# Patient Record
Sex: MALE | Race: WHITE | Employment: PART TIME | URBAN - METROPOLITAN AREA
[De-identification: names, ages, dates, MRNs, and addresses within clinical notes are randomized per-mention and may not be internally consistent; named-entity substitution may affect disease eponyms.]

---

## 2017-04-13 ENCOUNTER — OFFICE VISIT (OUTPATIENT)
Dept: FAMILY MEDICINE CLINIC | Age: 52
End: 2017-04-13

## 2017-04-13 VITALS
WEIGHT: 238.4 LBS | DIASTOLIC BLOOD PRESSURE: 102 MMHG | TEMPERATURE: 96.4 F | OXYGEN SATURATION: 94 % | HEART RATE: 101 BPM | RESPIRATION RATE: 16 BRPM | SYSTOLIC BLOOD PRESSURE: 161 MMHG | HEIGHT: 70 IN | BODY MASS INDEX: 34.13 KG/M2

## 2017-04-13 DIAGNOSIS — F10.20 ALCOHOLISM (HCC): Primary | ICD-10-CM

## 2017-04-13 RX ORDER — DISULFIRAM 250 MG/1
250 TABLET ORAL DAILY
Qty: 15 TAB | Refills: 0 | Status: SHIPPED | OUTPATIENT
Start: 2017-04-13 | End: 2017-04-17 | Stop reason: SDUPTHER

## 2017-04-13 RX ORDER — CHLORDIAZEPOXIDE HYDROCHLORIDE 10 MG/1
CAPSULE, GELATIN COATED ORAL
Qty: 30 CAP | Refills: 0 | Status: SHIPPED | OUTPATIENT
Start: 2017-04-13 | End: 2017-05-01

## 2017-04-13 NOTE — PROGRESS NOTES
Brad Quiroz Sr. is a 46 y.o.  male and presents with    Chief Complaint   Patient presents with    Alcohol intoxication           Subjective:  Pt is an out of control alcoholic that wants to stop. But when he stos gets very shaky. No hx of seizures from stopping. Additional Concerns:          Patient Active Problem List    Diagnosis Date Noted    Erectile dysfunction 05/27/2014    Back pain 05/27/2014    Depression 05/27/2014     Current Outpatient Prescriptions   Medication Sig Dispense Refill    chlordiazePOXIDE (LIBRIUM) 10 mg capsule 1 po tid x 3 days, then 1 po bid x 3 days, then 1 po daily. 30 Cap 0    disulfiram (ANTABUSE) 250 mg tablet Take 1 Tab by mouth daily. 15 Tab 0     No Known Allergies  Past Medical History:   Diagnosis Date    Depression      No past surgical history on file. No family history on file. Social History   Substance Use Topics    Smoking status: Former Smoker     Years: 23.00     Types: Cigarettes     Quit date: 2/20/2008    Smokeless tobacco: Never Used    Alcohol use Yes      Comment: 30 drinks a week that contain alcohol       ROS       All other systems reviewed and are negative.       Objective:  Vitals:    04/13/17 1335   BP: (!) 161/102   Pulse: (!) 101   Resp: 16   Temp: 96.4 °F (35.8 °C)   TempSrc: Oral   SpO2: 94%   Weight: 238 lb 6.4 oz (108.1 kg)   Height: 5' 10\" (1.778 m)   PainSc:   0 - No pain                 alert, well appearing, and in no distress, oriented to person, place, and time and normal appearing weight  Chest - clear to auscultation, no wheezes, rales or rhonchi, symmetric air entry  Heart - normal rate, regular rhythm, normal S1, S2, no murmurs, rubs, clicks or gallops  Abdomen - soft, nontender, nondistended, no masses or organomegaly        LABS     TESTS      Assessment/Plan:    Alcoholism  Will start anabuse and librium to help withdrawl  Check labs f/u  4 days      Lab review: labs are reviewed, up to date and normal      I have discussed the diagnosis with the patient and the intended plan as seen in the above orders. The patient has received an after-visit summary and questions were answered concerning future plans. I have discussed medication side effects and warnings with the patient as well. I have reviewed the plan of care with the patient, accepted their input and they are in agreement with the treatment goals.      Follow-up Disposition: Not on File

## 2017-04-13 NOTE — PROGRESS NOTES
1. Have you been to the ER, urgent care clinic since your last visit? Hospitalized since your last visit? No    2. Have you seen or consulted any other health care providers outside of the [address] [place] [address] since your last visit? Include any pap smears or colon screening.  No

## 2017-04-13 NOTE — MR AVS SNAPSHOT
Visit Information Date & Time Provider Department Dept. Phone Encounter #  
 4/13/2017  1:30 PM Emely Richardson [phone] [id number] Follow-up Instructions Return in about 4 days (around 4/17/2017) for EOV, labs today. Your Appointments 5/1/2017 12:30 PM  
COMPLETE PHYSICAL with [place]acie Cifuentes [place] [address] [facility]) Appt Note: cpe  
 [address] [place] [address]  
  
   
 [address] [facility] [address] Upcoming Health Maintenance Date Due Hepatitis C Screening 1965 DTaP/Tdap/Td series (2 - Td) 5/30/2011 FOBT Q 1 YEAR AGE 50-75 6/2/2015 INFLUENZA AGE 9 TO ADULT 8/1/2016 Allergies as of 4/13/2017  Review Complete On: 7/21/2014 By: Gracie Cifuentes DO No Known Allergies Current Immunizations  Reviewed on 5/27/2014 Name Date DTaP 5/30/2001 Hep A Vaccine 2/22/1999, 2/20/1993 MMR 5/15/1996 Meningococcal Vaccine 2/26/1985 Poliovirus vaccine 2/26/1985 Rubella Virus Vaccine 8/7/1986 Yellow Fever Vaccine 8/22/2006, 5/15/1996, 4/16/1985 Not reviewed this visit You Were Diagnosed With   
  
 Codes Comments Alcoholism ([facility].)    -  Primary ICD-10-CM: F10.20 ICD-9-CM: 303.90 Vitals BP Pulse Temp Resp Height(growth percentile) Weight(growth percentile) (!) 161/102 (BP 1 Location: Right arm, BP Patient Position: Sitting) (!) 101 96.4 °F (35.8 °C) (Oral) 16 5' 10\" (1.778 m) 238 lb 6.4 oz (108.1 kg) SpO2 BMI Smoking Status 94% 34.21 kg/m2 Former Smoker BMI and BSA Data Body Mass Index Body Surface Area  
 34.21 kg/m 2 2.31 m 2 Preferred Pharmacy Pharmacy Name Phone [id number] [place], [address] [place] [address] Ave [phone] Your Updated Medication List  
  
   
This list is accurate as of: 4/13/17  1:47 PM.  Always use your most recent med list.  
  
  
  
  
 chlordiazePOXIDE 10 mg capsule Commonly known as:  LIBRIUM  
1 po tid x 3 days, then 1 po bid x 3 days, then 1 po daily. disulfiram 250 mg tablet Commonly known as:  ANTABUSE Take 1 Tab by mouth daily. Prescriptions Printed Refills  
 chlordiazePOXIDE (LIBRIUM) 10 mg capsule 0 Si po tid x 3 days, then 1 po bid x 3 days, then 1 po daily. Class: Print  
 disulfiram (ANTABUSE) 250 mg tablet 0 Sig: Take 1 Tab by mouth daily. Class: Print Route: Oral  
  
Follow-up Instructions Return in about 4 days (around 2017) for EOV, labs today. To-Do List   
 2017 Lab:  CBC WITH AUTOMATED DIFF   
  
 2017 Lab:  GGT   
  
 2017 Lab:  HEMOGLOBIN A1C WITH EAG   
  
 2017 Lab:  LIPID PANEL   
  
 2017 Lab:  MAGNESIUM   
  
 2017 Lab:  METABOLIC PANEL, COMPREHENSIVE   
  
 2017 Lab:  PROSTATE SPECIFIC AG (PSA)   
  
 2017 Lab:  TSH 3RD GENERATION   
  
 2017 Lab:  URIC ACID   
  
 2017 Lab:  URINALYSIS W/ RFLX MICROSCOPIC   
  
 2017 Lab:  VITAMIN B12 & FOLATE   
  
 2017 Lab:  VITAMIN D, 1, 25 DIHYDROXY Introducing [facility] & HEALTH SERVICES! [employer] introduces [employer] patient portal. Now you can access parts of your medical record, email your doctor's office, and request medication refills online. 1. In your internet browser, go to [email or web address] 2. Click on the First Time User? Click Here link in the Sign In box. You will see the New Member Sign Up page. 3. Enter your [employer] Access Code exactly as it appears below. You will not need to use this code after youve completed the sign-up process. If you do not sign up before the expiration date, you must request a new code. · [employer] Access Code: [email or web address] Expires: 2017  1:47 PM 
 
 4. Enter the last four digits of your Social Security Number (xxxx) and Date of Birth (mm/dd/yyyy) as indicated and click Submit. You will be taken to the next sign-up page. 5. Create a [employer] ID. This will be your [employer] login ID and cannot be changed, so think of one that is secure and easy to remember. 6. Create a [employer] password. You can change your password at any time. 7. Enter your Password Reset Question and Answer. This can be used at a later time if you forget your password. 8. Enter your e-mail address. You will receive e-mail notification when new information is available in [address] 9. Click Sign Up. You can now view and download portions of your medical record. 10. Click the Download Summary menu link to download a portable copy of your medical information. If you have questions, please visit the Frequently Asked Questions section of the [employer] website. Remember, [employer] is NOT to be used for urgent needs. For medical emergencies, dial 911. Now available from your iPhone and Android! Please provide this summary of care documentation to your next provider. Your primary care clinician is listed as [address] [facility] [address]. If you have any questions after today's visit, please call [phone].

## 2017-04-14 ENCOUNTER — HOSPITAL ENCOUNTER (OUTPATIENT)
Dept: LAB | Age: 52
Discharge: HOME | End: 2017-04-14
Payer: OTHER GOVERNMENT

## 2017-04-14 DIAGNOSIS — F10.20 ALCOHOLISM (HCC): ICD-10-CM

## 2017-04-14 LAB
ALBUMIN SERPL BCP-MCNC: 4.3 G/DL (ref 3.4–5)
ALBUMIN/GLOB SERPL: 1.2 {RATIO} (ref 0.8–1.7)
ALP SERPL-CCNC: 103 U/L (ref 45–117)
ALT SERPL-CCNC: 190 U/L (ref 16–61)
ANION GAP BLD CALC-SCNC: 15 MMOL/L (ref 3–18)
APPEARANCE UR: ABNORMAL
AST SERPL W P-5'-P-CCNC: 180 U/L (ref 15–37)
BACTERIA URNS QL MICRO: ABNORMAL /HPF
BASOPHILS # BLD AUTO: 0 K/UL (ref 0–0.06)
BASOPHILS # BLD: 0 % (ref 0–2)
BILIRUB SERPL-MCNC: 1.9 MG/DL (ref 0.2–1)
BILIRUB UR QL: ABNORMAL
BUN SERPL-MCNC: 15 MG/DL (ref 7–18)
BUN/CREAT SERPL: 23 (ref 12–20)
CALCIUM SERPL-MCNC: 8.8 MG/DL (ref 8.5–10.1)
CHLORIDE SERPL-SCNC: 102 MMOL/L (ref 100–108)
CHOLEST SERPL-MCNC: 232 MG/DL
CO2 SERPL-SCNC: 24 MMOL/L (ref 21–32)
COLOR UR: ABNORMAL
CREAT SERPL-MCNC: 0.66 MG/DL (ref 0.6–1.3)
DIFFERENTIAL METHOD BLD: ABNORMAL
EOSINOPHIL # BLD: 0 K/UL (ref 0–0.4)
EOSINOPHIL NFR BLD: 0 % (ref 0–5)
EPITH CASTS URNS QL MICRO: ABNORMAL /LPF (ref 0–5)
ERYTHROCYTE [DISTWIDTH] IN BLOOD BY AUTOMATED COUNT: 14.5 % (ref 11.6–14.5)
EST. AVERAGE GLUCOSE BLD GHB EST-MCNC: NORMAL MG/DL
FOLATE SERPL-MCNC: 11.4 NG/ML (ref 3.1–17.5)
GLOBULIN SER CALC-MCNC: 3.5 G/DL (ref 2–4)
GLUCOSE SERPL-MCNC: 91 MG/DL (ref 74–99)
GLUCOSE UR STRIP.AUTO-MCNC: NEGATIVE MG/DL
HBA1C MFR BLD: 4.8 % (ref 4.2–5.6)
HCT VFR BLD AUTO: 44.5 % (ref 36–48)
HDLC SERPL-MCNC: 56 MG/DL (ref 40–60)
HDLC SERPL: 4.1 {RATIO} (ref 0–5)
HGB BLD-MCNC: 15.9 G/DL (ref 13–16)
HGB UR QL STRIP: ABNORMAL
KETONES UR QL STRIP.AUTO: 80 MG/DL
LDLC SERPL CALC-MCNC: 116 MG/DL (ref 0–100)
LEUKOCYTE ESTERASE UR QL STRIP.AUTO: ABNORMAL
LIPID PROFILE,FLP: ABNORMAL
LYMPHOCYTES # BLD AUTO: 51 % (ref 21–52)
LYMPHOCYTES # BLD: 2.4 K/UL (ref 0.9–3.6)
MAGNESIUM SERPL-MCNC: 1.7 MG/DL (ref 1.6–2.6)
MCH RBC QN AUTO: 35.1 PG (ref 24–34)
MCHC RBC AUTO-ENTMCNC: 35.7 G/DL (ref 31–37)
MCV RBC AUTO: 98.2 FL (ref 74–97)
MONOCYTES # BLD: 0.2 K/UL (ref 0.05–1.2)
MONOCYTES NFR BLD AUTO: 5 % (ref 3–10)
NEUTS SEG # BLD: 2 K/UL (ref 1.8–8)
NEUTS SEG NFR BLD AUTO: 44 % (ref 40–73)
NITRITE UR QL STRIP.AUTO: POSITIVE
PH UR STRIP: 6 [PH] (ref 5–8)
PLATELET # BLD AUTO: 101 K/UL (ref 135–420)
PMV BLD AUTO: 13 FL (ref 9.2–11.8)
POTASSIUM SERPL-SCNC: 3.7 MMOL/L (ref 3.5–5.5)
PROT SERPL-MCNC: 7.8 G/DL (ref 6.4–8.2)
PROT UR STRIP-MCNC: 300 MG/DL
PSA SERPL-MCNC: 1.4 NG/ML (ref 0–4)
RBC # BLD AUTO: 4.53 M/UL (ref 4.7–5.5)
RBC #/AREA URNS HPF: 0 /HPF (ref 0–5)
SODIUM SERPL-SCNC: 141 MMOL/L (ref 136–145)
SP GR UR REFRACTOMETRY: 1.02 (ref 1–1.03)
TRIGL SERPL-MCNC: 300 MG/DL (ref ?–150)
TSH SERPL DL<=0.05 MIU/L-ACNC: 0.46 UIU/ML (ref 0.36–3.74)
URATE SERPL-MCNC: 8.2 MG/DL (ref 2.6–7.2)
UROBILINOGEN UR QL STRIP.AUTO: 1 EU/DL (ref 0.2–1)
VIT B12 SERPL-MCNC: 910 PG/ML (ref 211–911)
VLDLC SERPL CALC-MCNC: 60 MG/DL
WBC # BLD AUTO: 4.6 K/UL (ref 4.6–13.2)
WBC URNS QL MICRO: ABNORMAL /HPF (ref 0–4)

## 2017-04-14 PROCEDURE — 82607 VITAMIN B-12: CPT | Performed by: FAMILY MEDICINE

## 2017-04-14 PROCEDURE — 84443 ASSAY THYROID STIM HORMONE: CPT | Performed by: FAMILY MEDICINE

## 2017-04-14 PROCEDURE — 84550 ASSAY OF BLOOD/URIC ACID: CPT | Performed by: FAMILY MEDICINE

## 2017-04-14 PROCEDURE — 80053 COMPREHEN METABOLIC PANEL: CPT | Performed by: FAMILY MEDICINE

## 2017-04-14 PROCEDURE — 82977 ASSAY OF GGT: CPT | Performed by: FAMILY MEDICINE

## 2017-04-14 PROCEDURE — 82652 VIT D 1 25-DIHYDROXY: CPT | Performed by: FAMILY MEDICINE

## 2017-04-14 PROCEDURE — 81001 URINALYSIS AUTO W/SCOPE: CPT | Performed by: FAMILY MEDICINE

## 2017-04-14 PROCEDURE — 85025 COMPLETE CBC W/AUTO DIFF WBC: CPT | Performed by: FAMILY MEDICINE

## 2017-04-14 PROCEDURE — 80061 LIPID PANEL: CPT | Performed by: FAMILY MEDICINE

## 2017-04-14 PROCEDURE — 83036 HEMOGLOBIN GLYCOSYLATED A1C: CPT | Performed by: FAMILY MEDICINE

## 2017-04-14 PROCEDURE — 36415 COLL VENOUS BLD VENIPUNCTURE: CPT | Performed by: FAMILY MEDICINE

## 2017-04-14 PROCEDURE — 83735 ASSAY OF MAGNESIUM: CPT | Performed by: FAMILY MEDICINE

## 2017-04-14 PROCEDURE — 84153 ASSAY OF PSA TOTAL: CPT | Performed by: FAMILY MEDICINE

## 2017-04-15 LAB — GGT SERPL-CCNC: 406 IU/L (ref 0–65)

## 2017-04-17 ENCOUNTER — OFFICE VISIT (OUTPATIENT)
Dept: FAMILY MEDICINE CLINIC | Age: 52
End: 2017-04-17

## 2017-04-17 VITALS
SYSTOLIC BLOOD PRESSURE: 200 MMHG | DIASTOLIC BLOOD PRESSURE: 100 MMHG | OXYGEN SATURATION: 97 % | RESPIRATION RATE: 14 BRPM | HEIGHT: 70 IN | WEIGHT: 238.4 LBS | TEMPERATURE: 97.7 F | HEART RATE: 92 BPM | BODY MASS INDEX: 34.13 KG/M2

## 2017-04-17 DIAGNOSIS — F10.20 ALCOHOLISM (HCC): Primary | ICD-10-CM

## 2017-04-17 DIAGNOSIS — I10 ESSENTIAL HYPERTENSION: ICD-10-CM

## 2017-04-17 RX ORDER — DISULFIRAM 250 MG/1
250 TABLET ORAL DAILY
Qty: 30 TAB | Refills: 1 | Status: SHIPPED | OUTPATIENT
Start: 2017-04-17 | End: 2017-05-01

## 2017-04-17 RX ORDER — CHLORDIAZEPOXIDE HYDROCHLORIDE 10 MG/1
CAPSULE, GELATIN COATED ORAL
Qty: 30 CAP | Refills: 1 | Status: SHIPPED | OUTPATIENT
Start: 2017-04-17 | End: 2017-05-01 | Stop reason: SDUPTHER

## 2017-04-17 RX ORDER — CLONIDINE HYDROCHLORIDE 0.1 MG/1
0.1 TABLET ORAL DAILY
Qty: 30 TAB | Refills: 1 | Status: SHIPPED | OUTPATIENT
Start: 2017-04-17 | End: 2017-05-01 | Stop reason: SDUPTHER

## 2017-04-17 NOTE — MR AVS SNAPSHOT
Visit Information Date & Time Provider Department Dept. Phone Encounter #  
 4/17/2017  8:30 AM Gracie RadhaZeeshan Anthony [phone] [id number] Follow-up Instructions Return in about 2 weeks (around 5/1/2017) for rov. Your Appointments 5/1/2017 12:30 PM  
COMPLETE [place]ICAL [place] [address] [place] [address]) Appt Note: cpe  
 [address] [place] [address]  
  
   
 [address] [facility] [address] Upcoming Health Maintenance Date Due Hepatitis C Screening 1965 Pneumococcal 19-64 Medium Risk (1 of 1 - PPSV23) 6/2/1984 DTaP/Tdap/Td series (2 - Td) 5/30/2011 FOBT Q 1 YEAR AGE 50-75 6/2/2015 INFLUENZA AGE 9 TO ADULT 8/1/2016 Allergies as of 4/17/2017  Review Complete On: 4/17/2017 By: Gracie Cifuentes DO No Known Allergies Current Immunizations  Reviewed on 5/27/2014 Name Date DTaP 5/30/2001 Hep A Vaccine 2/22/1999, 2/20/1993 MMR 5/15/1996 Meningococcal Vaccine 2/26/1985 Poliovirus vaccine 2/26/1985 Rubella Virus Vaccine 8/7/1986 Yellow Fever Vaccine 8/22/2006, 5/15/1996, 4/16/1985 Not reviewed this visit You Were Diagnosed With   
  
 Codes Comments Alcoholism ([facility]    -  Primary ICD-10-CM: F10.20 ICD-9-CM: 303.90 Essential hypertension     ICD-10-CM: I10 
ICD-9-CM: 401.9 Vitals BP Pulse Temp Resp Height(growth percentile) Weight(growth percentile) (!) 200/100 (BP 1 Location: Right arm, BP Patient Position: Sitting) 92 97.7 °F (36.5 °C) (Oral) 14 5' 10\" (1.778 m) 238 lb 6.4 oz (108.1 kg) SpO2 BMI Smoking Status 97% 34.21 kg/m2 Former Smoker BMI and BSA Data Body Mass Index Body Surface Area  
 34.21 kg/m 2 2.31 m 2 Preferred Pharmacy Pharmacy Name Phone  [address] [facility] [address] AT [facility] [phone] Your Updated Medication List  
  
   
This list is accurate as of: 17  8:51 AM.  Always use your most recent med list.  
  
  
  
  
 * chlordiazePOXIDE 10 mg capsule Commonly known as:  LIBRIUM  
1 po tid x 3 days, then 1 po bid x 3 days, then 1 po daily. * chlordiazePOXIDE 10 mg capsule Commonly known as:  LIBRIUM  
1 po bid x 7 days, then 1 po daily. cloNIDine HCl 0.1 mg tablet Commonly known as:  CATAPRES Take 1 Tab by mouth daily. disulfiram 250 mg tablet Commonly known as:  ANTABUSE Take 1 Tab by mouth daily. * Notice: This list has 2 medication(s) that are the same as other medications prescribed for you. Read the directions carefully, and ask your doctor or other care provider to review them with you. Prescriptions Printed Refills  
 chlordiazePOXIDE (LIBRIUM) 10 mg capsule 1 Si po bid x 7 days, then 1 po daily. Class: Print Prescriptions Sent to Pharmacy Refills  
 cloNIDine HCl (CATAPRES) 0.1 mg tablet 1 Sig: Take 1 Tab by mouth daily. Class: Normal  
 Pharmacy: [address] Ph #: [phone] Route: Oral  
 disulfiram (ANTABUSE) 250 mg tablet 1 Sig: Take 1 Tab by mouth daily. Class: Normal  
 Pharmacy: [address] Ph #: [phone] Route: Oral  
  
Follow-up Instructions Return in about 2 weeks (around 2017) for rov. Introducing [facility] & HEALTH SERVICES! Edna Morales introduces [employer] patient portal. Now you can access parts of your medical record, email your doctor's office, and request medication refills online. 1. In your internet browser, go to [email or web address] 2. Click on the First Time User? Click Here link in the Sign In box.  You will see the New Member Sign Up page. 3. Enter your [employer] Access Code exactly as it appears below. You will not need to use this code after youve completed the sign-up process. If you do not sign up before the expiration date, you must request a new code. · [employer] Access Code: [email or web address] Expires: 7/12/2017  1:47 PM 
 
4. Enter the last four digits of your Social Security Number (xxxx) and Date of Birth (mm/dd/yyyy) as indicated and click Submit. You will be taken to the next sign-up page. 5. Create a [employer] ID. This will be your [employer] login ID and cannot be changed, so think of one that is secure and easy to remember. 6. Create a [employer] password. You can change your password at any time. 7. Enter your Password Reset Question and Answer. This can be used at a later time if you forget your password. 8. Enter your e-mail address. You will receive e-mail notification when new information is available in [address] 9. Click Sign Up. You can now view and download portions of your medical record. 10. Click the Download Summary menu link to download a portable copy of your medical information. If you have questions, please visit the Frequently Asked Questions section of the [employer] website. Remember, [employer] is NOT to be used for urgent needs. For medical emergencies, dial 911. Now available from your iPhone and Android! Please provide this summary of care documentation to your next provider. Your primary care clinician is listed as [address] [facility]. If you have any questions after today's visit, please call [phone].

## 2017-04-17 NOTE — PROGRESS NOTES
Bigg Allan Sr. is a 46 y.o.  male and presents with    Chief Complaint   Patient presents with    Alcohol intoxication    Hypertension           Subjective:  1. Alcoholism -- has drank for 4 days. Taking librium and anabuse at this time  Somewhat shaky    2. htn probably seocnalry to alcohol withdrawl      Additional Concerns:          Patient Active Problem List    Diagnosis Date Noted    Alcoholism ([address] 04/17/2017    Essential hypertension 04/17/2017    Erectile dysfunction 05/27/2014    Back pain 05/27/2014    Depression 05/27/2014     Current Outpatient Prescriptions   Medication Sig Dispense Refill    chlordiazePOXIDE (LIBRIUM) 10 mg capsule 1 po bid x 7 days, then 1 po daily. 30 Cap 1    cloNIDine HCl (CATAPRES) 0.1 mg tablet Take 1 Tab by mouth daily. 30 Tab 1    disulfiram (ANTABUSE) 250 mg tablet Take 1 Tab by mouth daily. 30 Tab 1    chlordiazePOXIDE (LIBRIUM) 10 mg capsule 1 po tid x 3 days, then 1 po bid x 3 days, then 1 po daily. 30 Cap 0     No Known Allergies  Past Medical History:   Diagnosis Date    Depression      No past surgical history on file. No family history on file. Social History   Substance Use Topics    Smoking status: Former Smoker     Years: 23.00     Types: Cigarettes     Quit date: 2/20/2008    Smokeless tobacco: Never Used    Alcohol use Yes      Comment: 30 drinks a week that contain alcohol       ROS       All other systems reviewed and are negative.       Objective:  Vitals:    04/17/17 0837   BP: (!) 200/100   Pulse: 92   Resp: 14   Temp: 97.7 °F (36.5 °C)   TempSrc: Oral   SpO2: 97%   Weight: 238 lb 6.4 oz (108.1 kg)   Height: 5' 10\" (1.778 m)   PainSc:   0 - No pain                 alert, well appearing, and in no distress, oriented to person, place, and time and normal appearing weight  Chest - clear to auscultation, no wheezes, rales or rhonchi, symmetric air entry  Heart - normal rate, regular rhythm, normal S1, S2, no murmurs, rubs, clicks or gallops  Abdomen - soft, nontender, nondistended, no masses or organomegaly        LABS   Component      Latest Ref Rng & Units 4/14/2017 4/14/2017           8:31 AM  8:31 AM   WBC      4.6 - 13.2 K/uL     RBC      4.70 - 5.50 M/uL     HGB      13.0 - 16.0 g/dL     HCT      36.0 - 48.0 %     MCV      74.0 - 97.0 FL     MCH      24.0 - 34.0 PG     MCHC      31.0 - 37.0 g/dL     RDW      11.6 - 14.5 %     PLATELET      353 - 432 K/uL     MPV      9.2 - 11.8 FL     NEUTROPHILS      40 - 73 %     LYMPHOCYTES      21 - 52 %     MONOCYTES      3 - 10 %     EOSINOPHILS      0 - 5 %     BASOPHILS      0 - 2 %     ABS. NEUTROPHILS      1.8 - 8.0 K/UL     ABS. LYMPHOCYTES      0.9 - 3.6 K/UL     ABS. MONOCYTES      0.05 - 1.2 K/UL     ABS. EOSINOPHILS      0.0 - 0.4 K/UL     ABS. BASOPHILS      0.0 - 0.06 K/UL     DF           Sodium      136 - 145 mmol/L     Potassium      3.5 - 5.5 mmol/L     Chloride      100 - 108 mmol/L     CO2      21 - 32 mmol/L     Anion gap      3.0 - 18 mmol/L     Glucose      74 - 99 mg/dL     BUN      7.0 - 18 MG/DL     Creatinine      0.6 - 1.3 MG/DL     BUN/Creatinine ratio      12 - 20       GFR est AA      >60 ml/min/1.73m2     GFR est non-AA      >60 ml/min/1.73m2     Calcium      8.5 - 10.1 MG/DL     Bilirubin, total      0.2 - 1.0 MG/DL     ALT      16 - 61 U/L     AST      15 - 37 U/L     Alk.  phosphatase      45 - 117 U/L     Protein, total      6.4 - 8.2 g/dL     Albumin      3.4 - 5.0 g/dL     Globulin      2.0 - 4.0 g/dL     A-G Ratio      0.8 - 1.7       Color           Appearance           Specific gravity      1.005 - 1.030       pH (UA)      5.0 - 8.0       Protein      NEG mg/dL     Glucose      NEG mg/dL     Ketone      NEG mg/dL     Bilirubin      NEG       Blood      NEG       Urobilinogen      0.2 - 1.0 EU/dL     Nitrites      NEG       Leukocyte Esterase      NEG       Cholesterol, total      <200 MG/DL     Triglyceride      <150 MG/DL     HDL Cholesterol      40 - 60 MG/DL     LDL, calculated      0 - 100 MG/DL     VLDL, calculated      MG/DL     CHOL/HDL Ratio      0 - 5.0       WBC      0 - 4 /hpf  TOO NUMEROUS TO COUNT   RBC      0 - 5 /hpf  0   Epithelial cells      0 - 5 /lpf  FEW   Bacteria      NEG /hpf  4+ (A)   Vitamin B12      211 - 911 pg/mL     Folate      3.10 - 17.50 ng/mL     Hemoglobin A1c, (calculated)      4.2 - 5.6 %     Est. average glucose      mg/dL     GGT      0 - 65 IU/L 406 (H)    Prostate Specific Ag      0.0 - 4.0 ng/mL     TSH      0.36 - 3.74 uIU/mL     Magnesium      1.6 - 2.6 mg/dL     Uric acid      2.6 - 7.2 MG/DL       Component      Latest Ref Rng & Units 4/14/2017 4/14/2017 4/14/2017           8:31 AM  8:31 AM  8:31 AM   WBC      4.6 - 13.2 K/uL      RBC      4.70 - 5.50 M/uL      HGB      13.0 - 16.0 g/dL      HCT      36.0 - 48.0 %      MCV      74.0 - 97.0 FL      MCH      24.0 - 34.0 PG      MCHC      31.0 - 37.0 g/dL      RDW      11.6 - 14.5 %      PLATELET      048 - 567 K/uL      MPV      9.2 - 11.8 FL      NEUTROPHILS      40 - 73 %      LYMPHOCYTES      21 - 52 %      MONOCYTES      3 - 10 %      EOSINOPHILS      0 - 5 %      BASOPHILS      0 - 2 %      ABS. NEUTROPHILS      1.8 - 8.0 K/UL      ABS. LYMPHOCYTES      0.9 - 3.6 K/UL      ABS. MONOCYTES      0.05 - 1.2 K/UL      ABS. EOSINOPHILS      0.0 - 0.4 K/UL      ABS. BASOPHILS      0.0 - 0.06 K/UL      DF            Sodium      136 - 145 mmol/L      Potassium      3.5 - 5.5 mmol/L      Chloride      100 - 108 mmol/L      CO2      21 - 32 mmol/L      Anion gap      3.0 - 18 mmol/L      Glucose      74 - 99 mg/dL      BUN      7.0 - 18 MG/DL      Creatinine      0.6 - 1.3 MG/DL      BUN/Creatinine ratio      12 - 20        GFR est AA      >60 ml/min/1.73m2      GFR est non-AA      >60 ml/min/1.73m2      Calcium      8.5 - 10.1 MG/DL      Bilirubin, total      0.2 - 1.0 MG/DL      ALT      16 - 61 U/L      AST      15 - 37 U/L      Alk.  phosphatase      45 - 117 U/L Protein, total      6.4 - 8.2 g/dL      Albumin      3.4 - 5.0 g/dL      Globulin      2.0 - 4.0 g/dL      A-G Ratio      0.8 - 1.7        Color       DARK YELLOW     Appearance       CLOUDY     Specific gravity      1.005 - 1.030   1.022     pH (UA)      5.0 - 8.0   6.0     Protein      NEG mg/dL 300 (A)     Glucose      NEG mg/dL NEGATIVE     Ketone      NEG mg/dL 80 (A)     Bilirubin      NEG   SMALL (A)     Blood      NEG   SMALL (A)     Urobilinogen      0.2 - 1.0 EU/dL 1.0     Nitrites      NEG   POSITIVE (A)     Leukocyte Esterase      NEG   SMALL (A)     Cholesterol, total      <200 MG/DL      Triglyceride      <150 MG/DL      HDL Cholesterol      40 - 60 MG/DL      LDL, calculated      0 - 100 MG/DL      VLDL, calculated      MG/DL      CHOL/HDL Ratio      0 - 5.0        WBC      0 - 4 /hpf      RBC      0 - 5 /hpf      Epithelial cells      0 - 5 /lpf      Bacteria      NEG /hpf      Vitamin B12      211 - 911 pg/mL   910   Folate      3.10 - 17.50 ng/mL   11.4   Hemoglobin A1c, (calculated)      4.2 - 5.6 %      Est. average glucose      mg/dL      GGT      0 - 65 IU/L      Prostate Specific Ag      0.0 - 4.0 ng/mL  1.4    TSH      0.36 - 3.74 uIU/mL      Magnesium      1.6 - 2.6 mg/dL      Uric acid      2.6 - 7.2 MG/DL        Component      Latest Ref Rng & Units 4/14/2017 4/14/2017 4/14/2017           8:31 AM  8:31 AM  8:31 AM   WBC      4.6 - 13.2 K/uL      RBC      4.70 - 5.50 M/uL      HGB      13.0 - 16.0 g/dL      HCT      36.0 - 48.0 %      MCV      74.0 - 97.0 FL      MCH      24.0 - 34.0 PG      MCHC      31.0 - 37.0 g/dL      RDW      11.6 - 14.5 %      PLATELET      249 - 186 K/uL      MPV      9.2 - 11.8 FL      NEUTROPHILS      40 - 73 %      LYMPHOCYTES      21 - 52 %      MONOCYTES      3 - 10 %      EOSINOPHILS      0 - 5 %      BASOPHILS      0 - 2 %      ABS. NEUTROPHILS      1.8 - 8.0 K/UL      ABS. LYMPHOCYTES      0.9 - 3.6 K/UL      ABS. MONOCYTES      0.05 - 1.2 K/UL      ABS. EOSINOPHILS      0.0 - 0.4 K/UL      ABS. BASOPHILS      0.0 - 0.06 K/UL      DF            Sodium      136 - 145 mmol/L      Potassium      3.5 - 5.5 mmol/L      Chloride      100 - 108 mmol/L      CO2      21 - 32 mmol/L      Anion gap      3.0 - 18 mmol/L      Glucose      74 - 99 mg/dL      BUN      7.0 - 18 MG/DL      Creatinine      0.6 - 1.3 MG/DL      BUN/Creatinine ratio      12 - 20        GFR est AA      >60 ml/min/1.73m2      GFR est non-AA      >60 ml/min/1.73m2      Calcium      8.5 - 10.1 MG/DL      Bilirubin, total      0.2 - 1.0 MG/DL      ALT      16 - 61 U/L      AST      15 - 37 U/L      Alk.  phosphatase      45 - 117 U/L      Protein, total      6.4 - 8.2 g/dL      Albumin      3.4 - 5.0 g/dL      Globulin      2.0 - 4.0 g/dL      A-G Ratio      0.8 - 1.7        Color            Appearance            Specific gravity      1.005 - 1.030        pH (UA)      5.0 - 8.0        Protein      NEG mg/dL      Glucose      NEG mg/dL      Ketone      NEG mg/dL      Bilirubin      NEG        Blood      NEG        Urobilinogen      0.2 - 1.0 EU/dL      Nitrites      NEG        Leukocyte Esterase      NEG        Cholesterol, total      <200 MG/DL      Triglyceride      <150 MG/DL      HDL Cholesterol      40 - 60 MG/DL      LDL, calculated      0 - 100 MG/DL      VLDL, calculated      MG/DL      CHOL/HDL Ratio      0 - 5.0        WBC      0 - 4 /hpf      RBC      0 - 5 /hpf      Epithelial cells      0 - 5 /lpf      Bacteria      NEG /hpf      Vitamin B12      211 - 911 pg/mL      Folate      3.10 - 17.50 ng/mL      Hemoglobin A1c, (calculated)      4.2 - 5.6 % 4.8     Est. average glucose      mg/dL Cannot be calulated     GGT      0 - 65 IU/L      Prostate Specific Ag      0.0 - 4.0 ng/mL      TSH      0.36 - 3.74 uIU/mL      Magnesium      1.6 - 2.6 mg/dL   1.7   Uric acid      2.6 - 7.2 MG/DL  8.2 (H)      Component      Latest Ref Rng & Units 4/14/2017 4/14/2017 4/14/2017 4/14/2017           8:31 AM  8:31 AM  8:31 AM  8:31 AM   WBC      4.6 - 13.2 K/uL    4.6   RBC      4.70 - 5.50 M/uL    4.53 (L)   HGB      13.0 - 16.0 g/dL    15.9   HCT      36.0 - 48.0 %    44.5   MCV      74.0 - 97.0 FL    98.2 (H)   MCH      24.0 - 34.0 PG    35.1 (H)   MCHC      31.0 - 37.0 g/dL    35.7   RDW      11.6 - 14.5 %    14.5   PLATELET      995 - 236 K/uL    101 (L)   MPV      9.2 - 11.8 FL    13.0 (H)   NEUTROPHILS      40 - 73 %    44   LYMPHOCYTES      21 - 52 %    51   MONOCYTES      3 - 10 %    5   EOSINOPHILS      0 - 5 %    0   BASOPHILS      0 - 2 %    0   ABS. NEUTROPHILS      1.8 - 8.0 K/UL    2.0   ABS. LYMPHOCYTES      0.9 - 3.6 K/UL    2.4   ABS. MONOCYTES      0.05 - 1.2 K/UL    0.2   ABS. EOSINOPHILS      0.0 - 0.4 K/UL    0.0   ABS. BASOPHILS      0.0 - 0.06 K/UL    0.0   DF          AUTOMATED   Sodium      136 - 145 mmol/L  141     Potassium      3.5 - 5.5 mmol/L  3.7     Chloride      100 - 108 mmol/L  102     CO2      21 - 32 mmol/L  24     Anion gap      3.0 - 18 mmol/L  15     Glucose      74 - 99 mg/dL  91     BUN      7.0 - 18 MG/DL  15     Creatinine      0.6 - 1.3 MG/DL  0.66     BUN/Creatinine ratio      12 - 20    23 (H)     GFR est AA      >60 ml/min/1.73m2  >60     GFR est non-AA      >60 ml/min/1.73m2  >60     Calcium      8.5 - 10.1 MG/DL  8.8     Bilirubin, total      0.2 - 1.0 MG/DL  1.9 (H)     ALT      16 - 61 U/L  190 (H)     AST      15 - 37 U/L  180 (H)     Alk.  phosphatase      45 - 117 U/L  103     Protein, total      6.4 - 8.2 g/dL  7.8     Albumin      3.4 - 5.0 g/dL  4.3     Globulin      2.0 - 4.0 g/dL  3.5     A-G Ratio      0.8 - 1.7    1.2     Color             Appearance             Specific gravity      1.005 - 1.030         pH (UA)      5.0 - 8.0         Protein      NEG mg/dL       Glucose      NEG mg/dL       Ketone      NEG mg/dL       Bilirubin      NEG         Blood      NEG         Urobilinogen      0.2 - 1.0 EU/dL       Nitrites      NEG         Leukocyte Esterase      NEG Cholesterol, total      <200 MG/DL   232 (H)    Triglyceride      <150 MG/DL   300 (H)    HDL Cholesterol      40 - 60 MG/DL   56    LDL, calculated      0 - 100 MG/DL   116 (H)    VLDL, calculated      MG/DL   60    CHOL/HDL Ratio      0 - 5.0     4.1    WBC      0 - 4 /hpf       RBC      0 - 5 /hpf       Epithelial cells      0 - 5 /lpf       Bacteria      NEG /hpf       Vitamin B12      211 - 911 pg/mL       Folate      3.10 - 17.50 ng/mL       Hemoglobin A1c, (calculated)      4.2 - 5.6 %       Est. average glucose      mg/dL       GGT      0 - 65 IU/L       Prostate Specific Ag      0.0 - 4.0 ng/mL       TSH      0.36 - 3.74 uIU/mL 0.46      Magnesium      1.6 - 2.6 mg/dL       Uric acid      2.6 - 7.2 MG/DL       TESTS      Assessment/Plan:    Alcoholism in withdrawl -- will continue anabuse, librium and add some catapres for bp and withdrawl symptoms  F/u 2 wk      Lab review: labs are reviewed, up to date and normal      I have discussed the diagnosis with the patient and the intended plan as seen in the above orders. The patient has received an after-visit summary and questions were answered concerning future plans. I have discussed medication side effects and warnings with the patient as well. I have reviewed the plan of care with the patient, accepted their input and they are in agreement with the treatment goals. Follow-up Disposition:  Return in about 2 weeks (around 5/1/2017) for rov.

## 2017-04-17 NOTE — PROGRESS NOTES
Xi Aguirre . is a 46 y.o. male presented to clinic for f/u ethol intoxication. Pt is over due for a physical. Denies any pain at this time. 1. Have you been to the ER, urgent care clinic since your last visit? Hospitalized since your last visit? No    2. Have you seen or consulted any other health care providers outside of the [address] [place] [address] since your last visit? Include any pap smears or colon screening.  No     Learning Assessment 5/27/2014   PRIMARY LEARNER Patient   PRIMARY LANGUAGE ENGLISH   LEARNER PREFERENCE PRIMARY DEMONSTRATION   ANSWERED BY patient   RELATIONSHIP SELF   '

## 2017-04-18 LAB — 1,25(OH)2D3 SERPL-MCNC: 29.5 PG/ML (ref 19.9–79.3)

## 2017-04-27 ENCOUNTER — TELEPHONE (OUTPATIENT)
Dept: FAMILY MEDICINE CLINIC | Age: 52
End: 2017-04-27

## 2017-04-27 NOTE — TELEPHONE ENCOUNTER
Pt called stating that he has an appt for a physical on Monday but would like to know if Dr. Baldomero Mayo wanted him to get labs done.  Please assist.

## 2017-04-27 NOTE — TELEPHONE ENCOUNTER
Spoke with Lester Gee., Verified 2 patient identifiers. Per Dr. Whitney Nichols patient does not need any further labs done for his Monday appointment. Patient acknowledges and understands. No further action required.

## 2017-05-01 ENCOUNTER — OFFICE VISIT (OUTPATIENT)
Dept: FAMILY MEDICINE CLINIC | Age: 52
End: 2017-05-01

## 2017-05-01 VITALS
SYSTOLIC BLOOD PRESSURE: 134 MMHG | DIASTOLIC BLOOD PRESSURE: 83 MMHG | RESPIRATION RATE: 12 BRPM | WEIGHT: 224.2 LBS | BODY MASS INDEX: 32.1 KG/M2 | HEIGHT: 70 IN | HEART RATE: 95 BPM | TEMPERATURE: 97.6 F | OXYGEN SATURATION: 95 %

## 2017-05-01 DIAGNOSIS — I10 ESSENTIAL HYPERTENSION: ICD-10-CM

## 2017-05-01 DIAGNOSIS — F10.20 ALCOHOLISM (HCC): ICD-10-CM

## 2017-05-01 DIAGNOSIS — Z23 ENCOUNTER FOR IMMUNIZATION: ICD-10-CM

## 2017-05-01 DIAGNOSIS — Z00.00 ROUTINE GENERAL MEDICAL EXAMINATION AT A HEALTH CARE FACILITY: Primary | ICD-10-CM

## 2017-05-01 RX ORDER — CLONIDINE HYDROCHLORIDE 0.1 MG/1
0.1 TABLET ORAL DAILY
Qty: 90 TAB | Refills: 1 | Status: SHIPPED | OUTPATIENT
Start: 2017-05-01 | End: 2022-06-20

## 2017-05-01 RX ORDER — CHLORDIAZEPOXIDE HYDROCHLORIDE 10 MG/1
CAPSULE, GELATIN COATED ORAL
Qty: 30 CAP | Refills: 3 | Status: SHIPPED | OUTPATIENT
Start: 2017-05-01 | End: 2022-06-20

## 2017-05-01 NOTE — PROGRESS NOTES
Shagufta Young Sr. is a 46 y.o.  male and presents for a preventive health care visit        Subjective:  Health Maintenance History  Immunizations reviewed, dtap and pneumovax  indicated. colonoscopy: ordered, Eye exam: utd , Chest CT: na ,      Patient Active Problem List    Diagnosis Date Noted    Alcoholism ([facility] [place] [address] 04/17/2017    Essential hypertension 04/17/2017    Erectile dysfunction 05/27/2014    Back pain 05/27/2014    Depression 05/27/2014     Current Outpatient Prescriptions   Medication Sig Dispense Refill    cloNIDine HCl (CATAPRES) 0.1 mg tablet Take 1 Tab by mouth daily. 90 Tab 1    chlordiazePOXIDE (LIBRIUM) 10 mg capsule 1 po bid x 7 days, then 1 po daily. 30 Cap 3     No Known Allergies  Past Medical History:   Diagnosis Date    Depression      History reviewed. No pertinent surgical history. History reviewed. No pertinent family history.   Social History   Substance Use Topics    Smoking status: Former Smoker     Years: 23.00     Types: Cigarettes     Quit date: 2/20/2008    Smokeless tobacco: Never Used    Alcohol use Yes      Comment: 30 drinks a week that contain alcohol           ROS       General: negative for - chills, fatigue, fever, weight change  Psych: negative for - anxiety, depression, irritability or mood swings  ENT: negative for - headaches, hearing change, nasal congestion, oral lesions, sneezing or sore throat  Heme/ Lymph: negative for - bleeding problems, bruising, pallor or swollen lymph nodes  Endo: negative for - hot flashes, polydipsia/polyuria or temperature intolerance  Resp: negative for - cough, shortness of breath or wheezing  CV: negative for - chest pain, edema or palpitations  GI: negative for - abdominal pain, change in bowel habits, constipation, diarrhea or nausea/vomiting  : negative for - dysuria, hematuria, incontinence, pelvic pain or vulvar/vaginal symptoms  MSK: negative for - joint pain, joint swelling or muscle pain  Neuro: negative for - confusion, headaches, seizures or weakness  Derm: negative for - dry skin, hair changes, rash or skin lesion changes        Objective:  Vitals:    05/01/17 1236   BP: 134/83   Pulse: 95   Resp: 12   Temp: 97.6 °F (36.4 °C)   TempSrc: Oral   SpO2: 95%   Weight: 224 lb 3.2 oz (101.7 kg)   Height: 5' 10\" (1.778 m)   PainSc:   0 - No pain     alert, well appearing, and in no distress, oriented to person, place, and time and normal appearing weight  General appearance - alert, well appearing, and in no distress, oriented to person, place, and time and normal appearing weight   Visit Vitals    /83 (BP 1 Location: Right arm, BP Patient Position: Sitting)    Pulse 95    Temp 97.6 °F (36.4 °C) (Oral)    Resp 12    Ht 5' 10\" (1.778 m)    Wt 224 lb 3.2 oz (101.7 kg)    SpO2 95%    BMI 32.17 kg/m2       General appearance  alert, cooperative, no distress, appears stated age   Head  Normocephalic, without obvious abnormality, atraumatic   Eyes  conjunctivae/corneas clear. PERRL, EOM's intact. Fundi benign   Ears  normal TM's and external ear canals AU   Nose Nares normal. Septum midline. Mucosa normal. No drainage or sinus tenderness. Throat Lips, mucosa, and tongue normal. Teeth and gums normal   Neck supple, symmetrical, trachea midline, no adenopathy, thyroid: not enlarged, symmetric, no tenderness/mass/nodules, no carotid bruit and no JVD   Back   symmetric, no curvature. ROM normal. No CVA tenderness   Lungs   clear to auscultation bilaterally   Chest wall  no tenderness   Heart  regular rate and rhythm, S1, S2 normal, no murmur, click, rub or gallop   Abdomen   soft, non-tender.  Bowel sounds normal. No masses,  No organomegaly   Genitalia  Normal male   Rectal  Normal tone, normal prostate, no masses or tenderness  Guaiac negative stool   Extremities extremities normal, atraumatic, no cyanosis or edema   Pulses 2+ and symmetric   Skin Skin color, texture, turgor normal. No rashes or lesions Lymph nodes Cervical, supraclavicular, and axillary nodes normal.   Neurologic Normal       LABS  Component      Latest Ref Rng & Units 4/14/2017 4/14/2017 4/14/2017           8:31 AM  8:31 AM  8:31 AM   WBC      4.6 - 13.2 K/uL      RBC      4.70 - 5.50 M/uL      HGB      13.0 - 16.0 g/dL      HCT      36.0 - 48.0 %      MCV      74.0 - 97.0 FL      MCH      24.0 - 34.0 PG      MCHC      31.0 - 37.0 g/dL      RDW      11.6 - 14.5 %      PLATELET      390 - 156 K/uL      MPV      9.2 - 11.8 FL      NEUTROPHILS      40 - 73 %      LYMPHOCYTES      21 - 52 %      MONOCYTES      3 - 10 %      EOSINOPHILS      0 - 5 %      BASOPHILS      0 - 2 %      ABS. NEUTROPHILS      1.8 - 8.0 K/UL      ABS. LYMPHOCYTES      0.9 - 3.6 K/UL      ABS. MONOCYTES      0.05 - 1.2 K/UL      ABS. EOSINOPHILS      0.0 - 0.4 K/UL      ABS. BASOPHILS      0.0 - 0.06 K/UL      DF            Sodium      136 - 145 mmol/L      Potassium      3.5 - 5.5 mmol/L      Chloride      100 - 108 mmol/L      CO2      21 - 32 mmol/L      Anion gap      3.0 - 18 mmol/L      Glucose      74 - 99 mg/dL      BUN      7.0 - 18 MG/DL      Creatinine      0.6 - 1.3 MG/DL      BUN/Creatinine ratio      12 - 20        GFR est AA      >60 ml/min/1.73m2      GFR est non-AA      >60 ml/min/1.73m2      Calcium      8.5 - 10.1 MG/DL      Bilirubin, total      0.2 - 1.0 MG/DL      ALT      16 - 61 U/L      AST      15 - 37 U/L      Alk.  phosphatase      45 - 117 U/L      Protein, total      6.4 - 8.2 g/dL      Albumin      3.4 - 5.0 g/dL      Globulin      2.0 - 4.0 g/dL      A-G Ratio      0.8 - 1.7        Color            Appearance            Specific gravity      1.005 - 1.030        pH (UA)      5.0 - 8.0        Protein      NEG mg/dL      Glucose      NEG mg/dL      Ketone      NEG mg/dL      Bilirubin      NEG        Blood      NEG        Urobilinogen      0.2 - 1.0 EU/dL      Nitrites      NEG        Leukocyte Esterase      NEG        Cholesterol, total <200 MG/DL      Triglyceride      <150 MG/DL      HDL Cholesterol      40 - 60 MG/DL      LDL, calculated      0 - 100 MG/DL      VLDL, calculated      MG/DL      CHOL/HDL Ratio      0 - 5.0        WBC      0 - 4 /hpf   TOO NUMEROUS TO COUNT   RBC      0 - 5 /hpf   0   Epithelial cells      0 - 5 /lpf   FEW   Bacteria      NEG /hpf   4+ (A)   Vitamin B12      211 - 911 pg/mL      Folate      3.10 - 17.50 ng/mL      Hemoglobin A1c, (calculated)      4.2 - 5.6 %      Est. average glucose      mg/dL      GGT      0 - 65 IU/L  406 (H)    Prostate Specific Ag      0.0 - 4.0 ng/mL      TSH      0.36 - 3.74 uIU/mL      Magnesium      1.6 - 2.6 mg/dL      Calcitriol (Vit D 1, 25 di-OH)      19.9 - 79.3 pg/mL 29.5     Uric acid      2.6 - 7.2 MG/DL        Component      Latest Ref Rng & Units 4/14/2017 4/14/2017 4/14/2017           8:31 AM  8:31 AM  8:31 AM   WBC      4.6 - 13.2 K/uL      RBC      4.70 - 5.50 M/uL      HGB      13.0 - 16.0 g/dL      HCT      36.0 - 48.0 %      MCV      74.0 - 97.0 FL      MCH      24.0 - 34.0 PG      MCHC      31.0 - 37.0 g/dL      RDW      11.6 - 14.5 %      PLATELET      584 - 790 K/uL      MPV      9.2 - 11.8 FL      NEUTROPHILS      40 - 73 %      LYMPHOCYTES      21 - 52 %      MONOCYTES      3 - 10 %      EOSINOPHILS      0 - 5 %      BASOPHILS      0 - 2 %      ABS. NEUTROPHILS      1.8 - 8.0 K/UL      ABS. LYMPHOCYTES      0.9 - 3.6 K/UL      ABS. MONOCYTES      0.05 - 1.2 K/UL      ABS. EOSINOPHILS      0.0 - 0.4 K/UL      ABS.  BASOPHILS      0.0 - 0.06 K/UL      DF            Sodium      136 - 145 mmol/L      Potassium      3.5 - 5.5 mmol/L      Chloride      100 - 108 mmol/L      CO2      21 - 32 mmol/L      Anion gap      3.0 - 18 mmol/L      Glucose      74 - 99 mg/dL      BUN      7.0 - 18 MG/DL      Creatinine      0.6 - 1.3 MG/DL      BUN/Creatinine ratio      12 - 20        GFR est AA      >60 ml/min/1.73m2      GFR est non-AA      >60 ml/min/1.73m2      Calcium      8.5 - 10.1 MG/DL      Bilirubin, total      0.2 - 1.0 MG/DL      ALT      16 - 61 U/L      AST      15 - 37 U/L      Alk.  phosphatase      45 - 117 U/L      Protein, total      6.4 - 8.2 g/dL      Albumin      3.4 - 5.0 g/dL      Globulin      2.0 - 4.0 g/dL      A-G Ratio      0.8 - 1.7        Color       DARK YELLOW     Appearance       CLOUDY     Specific gravity      1.005 - 1.030   1.022     pH (UA)      5.0 - 8.0   6.0     Protein      NEG mg/dL 300 (A)     Glucose      NEG mg/dL NEGATIVE     Ketone      NEG mg/dL 80 (A)     Bilirubin      NEG   SMALL (A)     Blood      NEG   SMALL (A)     Urobilinogen      0.2 - 1.0 EU/dL 1.0     Nitrites      NEG   POSITIVE (A)     Leukocyte Esterase      NEG   SMALL (A)     Cholesterol, total      <200 MG/DL      Triglyceride      <150 MG/DL      HDL Cholesterol      40 - 60 MG/DL      LDL, calculated      0 - 100 MG/DL      VLDL, calculated      MG/DL      CHOL/HDL Ratio      0 - 5.0        WBC      0 - 4 /hpf      RBC      0 - 5 /hpf      Epithelial cells      0 - 5 /lpf      Bacteria      NEG /hpf      Vitamin B12      211 - 911 pg/mL   910   Folate      3.10 - 17.50 ng/mL   11.4   Hemoglobin A1c, (calculated)      4.2 - 5.6 %      Est. average glucose      mg/dL      GGT      0 - 65 IU/L      Prostate Specific Ag      0.0 - 4.0 ng/mL  1.4    TSH      0.36 - 3.74 uIU/mL      Magnesium      1.6 - 2.6 mg/dL      Calcitriol (Vit D 1, 25 di-OH)      19.9 - 79.3 pg/mL      Uric acid      2.6 - 7.2 MG/DL        Component      Latest Ref Rng & Units 4/14/2017 4/14/2017 4/14/2017           8:31 AM  8:31 AM  8:31 AM   WBC      4.6 - 13.2 K/uL      RBC      4.70 - 5.50 M/uL      HGB      13.0 - 16.0 g/dL      HCT      36.0 - 48.0 %      MCV      74.0 - 97.0 FL      MCH      24.0 - 34.0 PG      MCHC      31.0 - 37.0 g/dL      RDW      11.6 - 14.5 %      PLATELET      978 - 610 K/uL      MPV      9.2 - 11.8 FL      NEUTROPHILS      40 - 73 %      LYMPHOCYTES      21 - 52 % MONOCYTES      3 - 10 %      EOSINOPHILS      0 - 5 %      BASOPHILS      0 - 2 %      ABS. NEUTROPHILS      1.8 - 8.0 K/UL      ABS. LYMPHOCYTES      0.9 - 3.6 K/UL      ABS. MONOCYTES      0.05 - 1.2 K/UL      ABS. EOSINOPHILS      0.0 - 0.4 K/UL      ABS. BASOPHILS      0.0 - 0.06 K/UL      DF            Sodium      136 - 145 mmol/L      Potassium      3.5 - 5.5 mmol/L      Chloride      100 - 108 mmol/L      CO2      21 - 32 mmol/L      Anion gap      3.0 - 18 mmol/L      Glucose      74 - 99 mg/dL      BUN      7.0 - 18 MG/DL      Creatinine      0.6 - 1.3 MG/DL      BUN/Creatinine ratio      12 - 20        GFR est AA      >60 ml/min/1.73m2      GFR est non-AA      >60 ml/min/1.73m2      Calcium      8.5 - 10.1 MG/DL      Bilirubin, total      0.2 - 1.0 MG/DL      ALT      16 - 61 U/L      AST      15 - 37 U/L      Alk.  phosphatase      45 - 117 U/L      Protein, total      6.4 - 8.2 g/dL      Albumin      3.4 - 5.0 g/dL      Globulin      2.0 - 4.0 g/dL      A-G Ratio      0.8 - 1.7        Color            Appearance            Specific gravity      1.005 - 1.030        pH (UA)      5.0 - 8.0        Protein      NEG mg/dL      Glucose      NEG mg/dL      Ketone      NEG mg/dL      Bilirubin      NEG        Blood      NEG        Urobilinogen      0.2 - 1.0 EU/dL      Nitrites      NEG        Leukocyte Esterase      NEG        Cholesterol, total      <200 MG/DL      Triglyceride      <150 MG/DL      HDL Cholesterol      40 - 60 MG/DL      LDL, calculated      0 - 100 MG/DL      VLDL, calculated      MG/DL      CHOL/HDL Ratio      0 - 5.0        WBC      0 - 4 /hpf      RBC      0 - 5 /hpf      Epithelial cells      0 - 5 /lpf      Bacteria      NEG /hpf      Vitamin B12      211 - 911 pg/mL      Folate      3.10 - 17.50 ng/mL      Hemoglobin A1c, (calculated)      4.2 - 5.6 % 4.8     Est. average glucose      mg/dL Cannot be calulated     GGT      0 - 65 IU/L      Prostate Specific Ag      0.0 - 4.0 ng/mL TSH      0.36 - 3.74 uIU/mL      Magnesium      1.6 - 2.6 mg/dL   1.7   Calcitriol (Vit D 1, 25 di-OH)      19.9 - 79.3 pg/mL      Uric acid      2.6 - 7.2 MG/DL  8.2 (H)      Component      Latest Ref Rng & Units 4/14/2017 4/14/2017 4/14/2017 4/14/2017           8:31 AM  8:31 AM  8:31 AM  8:31 AM   WBC      4.6 - 13.2 K/uL    4.6   RBC      4.70 - 5.50 M/uL    4.53 (L)   HGB      13.0 - 16.0 g/dL    15.9   HCT      36.0 - 48.0 %    44.5   MCV      74.0 - 97.0 FL    98.2 (H)   MCH      24.0 - 34.0 PG    35.1 (H)   MCHC      31.0 - 37.0 g/dL    35.7   RDW      11.6 - 14.5 %    14.5   PLATELET      173 - 298 K/uL    101 (L)   MPV      9.2 - 11.8 FL    13.0 (H)   NEUTROPHILS      40 - 73 %    44   LYMPHOCYTES      21 - 52 %    51   MONOCYTES      3 - 10 %    5   EOSINOPHILS      0 - 5 %    0   BASOPHILS      0 - 2 %    0   ABS. NEUTROPHILS      1.8 - 8.0 K/UL    2.0   ABS. LYMPHOCYTES      0.9 - 3.6 K/UL    2.4   ABS. MONOCYTES      0.05 - 1.2 K/UL    0.2   ABS. EOSINOPHILS      0.0 - 0.4 K/UL    0.0   ABS. BASOPHILS      0.0 - 0.06 K/UL    0.0   DF          AUTOMATED   Sodium      136 - 145 mmol/L  141     Potassium      3.5 - 5.5 mmol/L  3.7     Chloride      100 - 108 mmol/L  102     CO2      21 - 32 mmol/L  24     Anion gap      3.0 - 18 mmol/L  15     Glucose      74 - 99 mg/dL  91     BUN      7.0 - 18 MG/DL  15     Creatinine      0.6 - 1.3 MG/DL  0.66     BUN/Creatinine ratio      12 - 20    23 (H)     GFR est AA      >60 ml/min/1.73m2  >60     GFR est non-AA      >60 ml/min/1.73m2  >60     Calcium      8.5 - 10.1 MG/DL  8.8     Bilirubin, total      0.2 - 1.0 MG/DL  1.9 (H)     ALT      16 - 61 U/L  190 (H)     AST      15 - 37 U/L  180 (H)     Alk.  phosphatase      45 - 117 U/L  103     Protein, total      6.4 - 8.2 g/dL  7.8     Albumin      3.4 - 5.0 g/dL  4.3     Globulin      2.0 - 4.0 g/dL  3.5     A-G Ratio      0.8 - 1.7    1.2     Color             Appearance             Specific gravity      1.005 - 1.030         pH (UA)      5.0 - 8.0         Protein      NEG mg/dL       Glucose      NEG mg/dL       Ketone      NEG mg/dL       Bilirubin      NEG         Blood      NEG         Urobilinogen      0.2 - 1.0 EU/dL       Nitrites      NEG         Leukocyte Esterase      NEG         Cholesterol, total      <200 MG/DL   232 (H)    Triglyceride      <150 MG/DL   300 (H)    HDL Cholesterol      40 - 60 MG/DL   56    LDL, calculated      0 - 100 MG/DL   116 (H)    VLDL, calculated      MG/DL   60    CHOL/HDL Ratio      0 - 5.0     4.1    WBC      0 - 4 /hpf       RBC      0 - 5 /hpf       Epithelial cells      0 - 5 /lpf       Bacteria      NEG /hpf       Vitamin B12      211 - 911 pg/mL       Folate      3.10 - 17.50 ng/mL       Hemoglobin A1c, (calculated)      4.2 - 5.6 %       Est. average glucose      mg/dL       GGT      0 - 65 IU/L       Prostate Specific Ag      0.0 - 4.0 ng/mL       TSH      0.36 - 3.74 uIU/mL 0.46      Magnesium      1.6 - 2.6 mg/dL       Calcitriol (Vit D 1, 25 di-OH)      19.9 - 79.3 pg/mL       Uric acid      2.6 - 7.2 MG/DL         TESTS  EKG  NSR      Assessment/Plan:  Healthy patient except as noted below:    Health Maintenance up to date. Recommend f/u physical 1 year. Routine screening labs/tests recommended prior to next physical.      Lab review: labs are reviewed, up to date and normal        I have discussed the diagnosis with the patient and the intended plan as seen in the above orders. The patient has received an after-visit summary and questions were answered concerning future plans. I have discussed medication side effects and warnings with the patient as well. I have reviewed the plan of care with the patient, accepted their input and they are in agreement with the treatment goals.          Follow-up Disposition: Not on File    -------------------------------------------------------------------------------------------------------------------    Problem Assessment  and also with   Chief Complaint   Patient presents with    Alcohol Problem    Depression    Hypertension         HPI ;  Cardiovascular Review:  The patient has hypertension. Diet and Lifestyle: not attempting to follow a low fat, low cholesterol diet, not attempting to follow a low sodium diet  Home BP Monitoring: is not measured at home. Pertinent ROS: taking medications as instructed, no medication side effects noted, no TIA's, no chest pain on exertion, no dyspnea on exertion, no swelling of ankles. Alcoholism currenly in remission-- down to 1 catapres and 1 librium per day. Doesn't take anabuse. Additional Concerns: elev lft seconary to etoh    elev uric acid ?  Probably seocnalry to alcoh          Assessment/Plan:      Hypertension - stable, leave on catapres and recheck i3 mo  alcoholsm in remission  Elevated lft recheck in 3 mo  Uric acid recheck in 3 mo             Lab review: labs are reviewed, up to date and normal, orders written for new lab studies as appropriate; see orders

## 2017-05-01 NOTE — PROGRESS NOTES
Arnie Chetnaestela Francisco. is a 46 y.o. male presented to clinic for annual physical. Pt denies any concerns or pain at this time. 1. Have you been to the ER, urgent care clinic since your last visit? Hospitalized since your last visit? No    2. Have you seen or consulted any other health care providers outside of the [address] [place] [address] since your last visit? Include any pap smears or colon screening.  No     Learning Assessment 5/27/2014   PRIMARY LEARNER Patient   PRIMARY LANGUAGE ENGLISH   LEARNER PREFERENCE PRIMARY DEMONSTRATION   ANSWERED BY patient   RELATIONSHIP SELF

## 2017-05-01 NOTE — MR AVS SNAPSHOT
Visit Information Date & Time Provider Department Dept. Phone Encounter #  
 5/1/2017 12:30 PM Emely Richardson [phone] [id number] Follow-up Instructions Return in about 3 months (around 8/1/2017) for rov, labs prior. Upcoming Health Maintenance Date Due Hepatitis C Screening 1965 COLONOSCOPY 6/2/1983 Pneumococcal 19-64 Medium Risk (1 of 1 - PPSV23) 6/2/1984 DTaP/Tdap/Td series (2 - Td) 5/30/2011 INFLUENZA AGE 9 TO ADULT 8/1/2017 Allergies as of 5/1/2017  Review Complete On: 5/1/2017 By: Danna Hobbs., DO No Known Allergies Current Immunizations  Reviewed on 5/27/2014 Name Date DTaP 5/30/2001 Hep A Vaccine 2/22/1999, 2/20/1993 MMR 5/15/1996 Meningococcal Vaccine 2/26/1985 Pneumococcal Polysaccharide (PPSV-23)  Incomplete Poliovirus vaccine 2/26/1985 Rubella Virus Vaccine 8/7/1986 Tdap  Incomplete Yellow Fever Vaccine 8/22/2006, 5/15/1996, 4/16/1985 Not reviewed this visit You Were Diagnosed With   
  
 Codes Comments Routine general medical examination at a health care facility    -  Primary ICD-10-CM: Z00.00 ICD-9-CM: V70.0 Essential hypertension     ICD-10-CM: I10 
ICD-9-CM: 401.9 Encounter for immunization     ICD-10-CM: G20 ICD-9-CM: V03.89 Alcoholism ([facility] [address]     ICD-10-CM: A41.80 ICD-9-CM: 303.90 Vitals BP Pulse Temp Resp Height(growth percentile) Weight(growth percentile) 134/83 (BP 1 Location: Right arm, BP Patient Position: Sitting) 95 97.6 °F (36.4 °C) (Oral) 12 5' 10\" (1.778 m) 224 lb 3.2 oz (101.7 kg) SpO2 BMI Smoking Status 95% 32.17 kg/m2 Former Smoker BMI and BSA Data Body Mass Index Body Surface Area  
 32.17 kg/m 2 2.24 m 2 Preferred Pharmacy Pharmacy Name Phone [facility] [address] [phone] Your Updated Medication List  
  
   
This list is accurate as of: 17 12:48 PM.  Always use your most recent med list.  
  
  
  
  
 chlordiazePOXIDE 10 mg capsule Commonly known as:  LIBRIUM  
1 po bid x 7 days, then 1 po daily. cloNIDine HCl 0.1 mg tablet Commonly known as:  CATAPRES Take 1 Tab by mouth daily. Prescriptions Printed Refills  
 chlordiazePOXIDE (LIBRIUM) 10 mg capsule 3 Si po bid x 7 days, then 1 po daily. Class: Print Prescriptions Sent to Pharmacy Refills  
 cloNIDine HCl (CATAPRES) 0.1 mg tablet 1 Sig: Take 1 Tab by mouth daily. Class: Normal  
 Pharmacy: [facility] [address] #: [phone] Route: Oral  
  
We Performed the Following AMB POC EKG ROUTINE  LEADS, INTER & REP [41435 CPT(R)] PNEUMOCOCCAL POLYSACCHARIDE VACCINE, 23-VALENT, ADULT OR IMMUNOSUPPRESSED PT DOSE, [30084 CPT(R)] REFERRAL TO GASTROENTEROLOGY [IMR57 Custom] Comments:  
 Please evaluate patient for routine screening colnoscopy. 1st available is fine. TETANUS, DIPHTHERIA TOXOIDS AND ACELLULAR PERTUSSIS VACCINE (TDAP), IN INDIVIDS. >=7, IM [id number] CPT(R)] Follow-up Instructions Return in about 3 months (around 2017) for rov, labs prior. To-Do List   
 Around 2017 Lab:  GGT Around 2017 Lab:  METABOLIC PANEL, COMPREHENSIVE Around 2017 Lab:  URIC ACID Referral Information Referral ID Referred By Referred To  
  
 [id number] Ramona [address], [place]   
   [address] [place] [address] [address] [place] [address] Phone: [phone] Fax: [phone] Visits Status Start Date End Date 1 New Request 17 If your referral has a status of pending review or denied, additional information will be sent to support the outcome of this decision. Introducing [facility]! Bel Yomi introduces [employer] patient portal. Now you can access parts of your medical record, email your doctor's office, and request medication refills online. 1. In your internet browser, go to [email or web address] 2. Click on the First Time User? Click Here link in the Sign In box. You will see the New Member Sign Up page. 3. Enter your [employer] Access Code exactly as it appears below. You will not need to use this code after youve completed the sign-up process. If you do not sign up before the expiration date, you must request a new code. · [employer] Access Code: [email or web address] Expires: 7/12/2017  1:47 PM 
 
4. Enter the last four digits of your Social Security Number (xxxx) and Date of Birth (mm/dd/yyyy) as indicated and click Submit. You will be taken to the next sign-up page. 5. Create a [employer] ID. This will be your [employer] login ID and cannot be changed, so think of one that is secure and easy to remember. 6. Create a [employer] password. You can change your password at any time. 7. Enter your Password Reset Question and Answer. This can be used at a later time if you forget your password. 8. Enter your e-mail address. You will receive e-mail notification when new information is available in [address] 9. Click Sign Up. You can now view and download portions of your medical record. 10. Click the Download Summary menu link to download a portable copy of your medical information. If you have questions, please visit the Frequently Asked Questions section of the [employer] website. Remember, [employer] is NOT to be used for urgent needs. For medical emergencies, dial 911. Now available from your iPhone and Android! Please provide this summary of care documentation to your next provider. Your primary care clinician is listed as [address] If you have any questions after today's visit, please call [phone].